# Patient Record
Sex: FEMALE | Race: BLACK OR AFRICAN AMERICAN | NOT HISPANIC OR LATINO | Employment: FULL TIME | URBAN - METROPOLITAN AREA
[De-identification: names, ages, dates, MRNs, and addresses within clinical notes are randomized per-mention and may not be internally consistent; named-entity substitution may affect disease eponyms.]

---

## 2024-04-30 ENCOUNTER — TELEMEDICINE (OUTPATIENT)
Dept: DERMATOLOGY | Facility: CLINIC | Age: 29
End: 2024-04-30
Payer: COMMERCIAL

## 2024-04-30 DIAGNOSIS — L70.0 ACNE VULGARIS: Primary | ICD-10-CM

## 2024-04-30 PROCEDURE — 99214 OFFICE O/P EST MOD 30 MIN: CPT | Performed by: DERMATOLOGY

## 2024-04-30 RX ORDER — CLINDAMYCIN AND BENZOYL PEROXIDE 10; 50 MG/G; MG/G
GEL TOPICAL 2 TIMES DAILY
Qty: 50 G | Refills: 11 | Status: SHIPPED | OUTPATIENT
Start: 2024-04-30 | End: 2024-04-30

## 2024-04-30 RX ORDER — CLINDAMYCIN AND BENZOYL PEROXIDE 10; 50 MG/G; MG/G
GEL TOPICAL
Qty: 50 G | Refills: 11 | Status: SHIPPED | OUTPATIENT
Start: 2024-04-30

## 2024-04-30 RX ORDER — TRETINOIN 0.5 MG/G
CREAM TOPICAL
Qty: 45 G | Refills: 3 | Status: SHIPPED | OUTPATIENT
Start: 2024-04-30

## 2024-04-30 ASSESSMENT — DERMATOLOGY QUALITY OF LIFE (QOL) ASSESSMENT
RATE HOW BOTHERED YOU ARE BY EFFECTS OF YOUR SKIN PROBLEMS ON YOUR ACTIVITIES (EG, GOING OUT, ACCOMPLISHING WHAT YOU WANT, WORK ACTIVITIES OR YOUR RELATIONSHIPS WITH OTHERS): 4
RATE HOW EMOTIONALLY BOTHERED YOU ARE BY YOUR SKIN PROBLEM (FOR EXAMPLE, WORRY, EMBARRASSMENT, FRUSTRATION): 5
WHAT SINGLE SKIN CONDITION LISTED BELOW IS THE PATIENT ANSWERING THE QUALITY-OF-LIFE ASSESSMENT QUESTIONS ABOUT: ACNE
DATE THE QUALITY-OF-LIFE ASSESSMENT WAS COMPLETED: 66960
WHAT SINGLE SKIN CONDITION LISTED BELOW IS THE PATIENT ANSWERING THE QUALITY-OF-LIFE ASSESSMENT QUESTIONS ABOUT: ACNE
RATE HOW BOTHERED YOU ARE BY SYMPTOMS OF YOUR SKIN PROBLEM (EG, ITCHING, STINGING BURNING, HURTING OR SKIN IRRITATION): 1
RATE HOW BOTHERED YOU ARE BY EFFECTS OF YOUR SKIN PROBLEMS ON YOUR ACTIVITIES (EG, GOING OUT, ACCOMPLISHING WHAT YOU WANT, WORK ACTIVITIES OR YOUR RELATIONSHIPS WITH OTHERS): 4
RATE HOW BOTHERED YOU ARE BY SYMPTOMS OF YOUR SKIN PROBLEM (EG, ITCHING, STINGING BURNING, HURTING OR SKIN IRRITATION): 1
RATE HOW EMOTIONALLY BOTHERED YOU ARE BY YOUR SKIN PROBLEM (FOR EXAMPLE, WORRY, EMBARRASSMENT, FRUSTRATION): 5

## 2024-04-30 NOTE — PROGRESS NOTES
Subjective     Nikkie Gutierrez is a 28 y.o. female who presents for the following: Acne.     Patient has had acne since 11yo. Patient has seen by dermatology at [facility]-- per last 2023 [facility] derm note: Patient has tried adapalene, effaclar 2 step, clindamycin lotion, La Roche Posay face wash, benzoyl peroxide wash, minocycline at age 14-15 and tetracycline- antibiotics failed in the past   azelaic acid cream- was not covered by insurance.     Patient is currently using over the counter adapalene gel nightly and has been using Neutrogena and Cetaphil cleanser. Patient stopped using PanOxyl wash because it was irritating. Patient is also using an Aveeno moisturizer with 30 spf daily.     Patient says that her acne is really bad and is affected her whole face and sometimes chest and back.     Skin Cancer History  No skin cancer on file.      Review of Systems:  No other skin or systemic complaints other than what is documented elsewhere in the note.    The following portions of the chart were reviewed this encounter and updated as appropriate:  Allergies       Specialty Problems    None    Past Medical History:  Nikkie Gutierrez  has no past medical history on file.    Past Surgical History:  Nikkie Gutierrez  has no past surgical history on file.    Family History:  Patient family history is not on file.    Social History:  Nikkie Gutierrez  has no history on file for tobacco use, alcohol use, and drug use.    Allergies:  Patient has no known allergies.    Current Medications / CAM's:    Current Outpatient Medications:     clindamycin-benzoyl peroxide (Benzaclin) gel, Apply to face in AM. Apply to chest, shoulders back in PM., Disp: 50 g, Rfl: 11    tretinoin (Retin-A) 0.05 % cream, Apply a small 1/2 pea sized amount to clean dry face at bedtime.  Start off 2x/week and increase as tolerated., Disp: 45 g, Rfl: 3     Objective   Well appearing patient in no apparent distress; mood and affect are within normal limits.    A  focused examination was performed including face and chest. All findings within normal limits unless otherwise noted below.    Head - Anterior (Face)  Scattered and inflammatory papulopustules and cysts on chin/jawline, nose, cheeks, forehead.         Assessment/Plan   Acne vulgaris  Head - Anterior (Face)    Moderate inflammatory and cystic acne with significant acne excoriee and possible hormonal component on face/neck with postinflammatory hyperpigmentation. Nature reviewed.   - Start clindamycin-benzoyl peroxide gel daily in AM on face and in PM on body (wear old T shirt to bed as the benzoyl peroxide can bleach fabrics)  - Recommended a noncomedogenic 30+ spf, such as Eucerin tinted sensitive mineral (can buy anywhere over the counter), tinted EltaMD UV clear, tinted Tizo sunscreen (buy these 2 on Amazon as its cheaper).   Other sunscreens that are less likely to leave white residue on dark skin tones include: Anthelios Clear Skin Sunscreen by La Roche-posay SPF 60, Black girl sunscreen SPF 30 (blackgirlsunscreen.com). Examples of tinted sunscreens include:  Colorescience products (colorSportskeedaenceThumbAd), Alastin Skincare hydra-tint spf 36 pro mineral sunscreen (alastin.com), PCA skin sheer tint SPF 45 (amazon.com), Skinceuticals Physical Fusion SPF 50 (skinceuticals.[email or web address], dermstore.Allakos), EltaMD Skin care UV Clear SPF 46 tinted (amazon.com), Zohreh Ramos (tinted sunscreen options; Shanice). Double check that any of these are noncomedogenic (non pore blocking) before purchasing.   - Start regular noncomedogenic moisturizer at night   - Start tretinoin 0.05% cream in PM on face. Counseled patient to apply a pea-sized amount to whole face. Start 2-3x a week and increase as tolerated. Avoid with pregnancy. She does depo provera  - Counseled patient that her it will take 2-3 months for her to see results from her new regimen.   - Consider adding spironolactone pending course and tolerance of above medications. She notes  that she has sensitive skin which may limit topical escalation. See prior meds Rx for her acne in HPI above  - RTC in 3 months.     tretinoin (Retin-A) 0.05 % cream - Head - Anterior (Face)  Apply a small 1/2 pea sized amount to clean dry face at bedtime.  Start off 2x/week and increase as tolerated.    Related Medications  clindamycin-benzoyl peroxide (Benzaclin) gel  Apply to face in AM. Apply to chest, shoulders back in PM.       Chelsea Bashir MD     Virtual or Telephone Consent    An interactive audio and video telecommunication system which permits real time communications between the patient (at the originating site) and provider (at the distant site) was utilized to provide this telehealth service.   Verbal consent was requested and obtained from Nikkie Gutierrez on this date, 05/01/24 for a telehealth visit.      I saw and evaluated the patient. I personally obtained the key and critical portions of the history and physical exam or was physically present for key and critical portions performed by the resident/fellow. I reviewed the resident/fellow's documentation and discussed the patient with the resident/fellow. I agree with the resident/fellow's medical decision making as documented in the note.    Magali Rondon MD

## 2024-04-30 NOTE — Clinical Note
Mckayla - this patient needs a prior auth for her tretinoin 0.05% cream. I listed out all her prior meds that didn't work so hopefully that will help it to go through. Also can you call the patient to update her med list? Thank you so much!

## 2024-05-01 ENCOUNTER — TELEPHONE (OUTPATIENT)
Dept: DERMATOLOGY | Facility: CLINIC | Age: 29
End: 2024-05-01
Payer: COMMERCIAL

## 2024-05-01 NOTE — TELEPHONE ENCOUNTER
Received an In Basket message from pt's care team to schedule a 3 month FUV.  See Outgoing Call comment

## 2024-05-02 ENCOUNTER — TELEPHONE (OUTPATIENT)
Dept: DERMATOLOGY | Facility: CLINIC | Age: 29
End: 2024-05-02
Payer: COMMERCIAL

## 2024-05-02 NOTE — TELEPHONE ENCOUNTER
PA initiated through [facility] by fax for tretinoin cream.  Last visit note sent with form.

## 2024-05-06 ENCOUNTER — TELEPHONE (OUTPATIENT)
Dept: DERMATOLOGY | Facility: CLINIC | Age: 29
End: 2024-05-06
Payer: COMMERCIAL

## 2024-08-27 ENCOUNTER — APPOINTMENT (OUTPATIENT)
Dept: DERMATOLOGY | Facility: CLINIC | Age: 29
End: 2024-08-27
Payer: COMMERCIAL

## 2024-08-28 ENCOUNTER — APPOINTMENT (OUTPATIENT)
Dept: DERMATOLOGY | Facility: CLINIC | Age: 29
End: 2024-08-28
Payer: COMMERCIAL